# Patient Record
Sex: FEMALE | Race: WHITE | Employment: STUDENT | URBAN - METROPOLITAN AREA
[De-identification: names, ages, dates, MRNs, and addresses within clinical notes are randomized per-mention and may not be internally consistent; named-entity substitution may affect disease eponyms.]

---
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
## 2023-01-28 DIAGNOSIS — F41.1 GENERALIZED ANXIETY DISORDER: ICD-10-CM

## 2023-01-30 RX ORDER — ESCITALOPRAM OXALATE 5 MG/1
5 TABLET ORAL DAILY
Qty: 60 TABLET | Refills: 0 | Status: SHIPPED | OUTPATIENT
Start: 2023-01-30

## 2023-03-08 ENCOUNTER — TELEPHONE (OUTPATIENT)
Dept: FAMILY MEDICINE CLINIC | Facility: CLINIC | Age: 18
End: 2023-03-08

## 2023-03-08 NOTE — TELEPHONE ENCOUNTER
lvm for pt change her appt time on 3/23 due to Dr Elmira Meneses new schedule. Have two later openings on 3/23.

## 2023-03-24 ENCOUNTER — OFFICE VISIT (OUTPATIENT)
Dept: FAMILY MEDICINE CLINIC | Facility: CLINIC | Age: 18
End: 2023-03-24
Payer: COMMERCIAL

## 2023-03-24 VITALS
OXYGEN SATURATION: 98 % | HEIGHT: 65 IN | BODY MASS INDEX: 17.99 KG/M2 | TEMPERATURE: 98 F | RESPIRATION RATE: 16 BRPM | DIASTOLIC BLOOD PRESSURE: 60 MMHG | SYSTOLIC BLOOD PRESSURE: 100 MMHG | HEART RATE: 60 BPM | WEIGHT: 108 LBS

## 2023-03-24 DIAGNOSIS — N94.6 DYSMENORRHEA: ICD-10-CM

## 2023-03-24 DIAGNOSIS — F41.1 GENERALIZED ANXIETY DISORDER: ICD-10-CM

## 2023-03-24 DIAGNOSIS — Z30.41 ENCOUNTER FOR SURVEILLANCE OF CONTRACEPTIVE PILLS: ICD-10-CM

## 2023-03-24 PROCEDURE — 99214 OFFICE O/P EST MOD 30 MIN: CPT | Performed by: FAMILY MEDICINE

## 2023-03-24 PROCEDURE — 3078F DIAST BP <80 MM HG: CPT | Performed by: FAMILY MEDICINE

## 2023-03-24 PROCEDURE — 3008F BODY MASS INDEX DOCD: CPT | Performed by: FAMILY MEDICINE

## 2023-03-24 PROCEDURE — 3074F SYST BP LT 130 MM HG: CPT | Performed by: FAMILY MEDICINE

## 2023-03-24 RX ORDER — NORETHINDRONE ACETATE AND ETHINYL ESTRADIOL 1MG-20(21)
1 KIT ORAL DAILY
Qty: 84 TABLET | Refills: 1 | Status: SHIPPED | OUTPATIENT
Start: 2023-03-24

## 2023-03-24 RX ORDER — ESCITALOPRAM OXALATE 5 MG/1
5 TABLET ORAL DAILY
Qty: 60 TABLET | Refills: 0 | Status: SHIPPED | OUTPATIENT
Start: 2023-03-24

## 2023-08-09 ENCOUNTER — MED REC SCAN ONLY (OUTPATIENT)
Dept: FAMILY MEDICINE CLINIC | Facility: CLINIC | Age: 18
End: 2023-08-09

## 2023-08-09 ENCOUNTER — APPOINTMENT (OUTPATIENT)
Dept: FAMILY MEDICINE CLINIC | Facility: CLINIC | Age: 18
End: 2023-08-09
Payer: COMMERCIAL

## 2023-10-16 ENCOUNTER — OFFICE VISIT (OUTPATIENT)
Dept: FAMILY MEDICINE CLINIC | Facility: CLINIC | Age: 18
End: 2023-10-16
Payer: COMMERCIAL

## 2023-10-16 ENCOUNTER — LAB ENCOUNTER (OUTPATIENT)
Dept: LAB | Age: 18
End: 2023-10-16
Attending: FAMILY MEDICINE
Payer: COMMERCIAL

## 2023-10-16 VITALS
BODY MASS INDEX: 18.33 KG/M2 | TEMPERATURE: 97 F | RESPIRATION RATE: 16 BRPM | WEIGHT: 110 LBS | HEIGHT: 65 IN | DIASTOLIC BLOOD PRESSURE: 78 MMHG | HEART RATE: 67 BPM | SYSTOLIC BLOOD PRESSURE: 110 MMHG | OXYGEN SATURATION: 99 %

## 2023-10-16 DIAGNOSIS — Z11.8 SCREENING FOR CHLAMYDIAL DISEASE: ICD-10-CM

## 2023-10-16 DIAGNOSIS — D75.89 MACROCYTOSIS: ICD-10-CM

## 2023-10-16 DIAGNOSIS — Z00.01 ENCOUNTER FOR GENERAL ADULT MEDICAL EXAMINATION WITH ABNORMAL FINDINGS: Primary | ICD-10-CM

## 2023-10-16 DIAGNOSIS — H61.21 IMPACTED CERUMEN OF RIGHT EAR: ICD-10-CM

## 2023-10-16 DIAGNOSIS — R79.89 ELEVATED SERUM CREATININE: ICD-10-CM

## 2023-10-16 DIAGNOSIS — Z30.41 ENCOUNTER FOR SURVEILLANCE OF CONTRACEPTIVE PILLS: ICD-10-CM

## 2023-10-16 DIAGNOSIS — N94.6 DYSMENORRHEA: ICD-10-CM

## 2023-10-16 DIAGNOSIS — F41.8 SITUATIONAL ANXIETY: ICD-10-CM

## 2023-10-16 PROBLEM — F41.1 GAD (GENERALIZED ANXIETY DISORDER): Status: ACTIVE | Noted: 2023-10-16

## 2023-10-16 PROBLEM — M79.671 RIGHT FOOT PAIN: Status: RESOLVED | Noted: 2021-08-01 | Resolved: 2023-10-16

## 2023-10-16 PROBLEM — M21.611 BUNION, RIGHT: Status: RESOLVED | Noted: 2021-06-01 | Resolved: 2023-10-16

## 2023-10-16 LAB
ANION GAP SERPL CALC-SCNC: 4 MMOL/L (ref 0–18)
BASOPHILS # BLD AUTO: 0.05 X10(3) UL (ref 0–0.2)
BASOPHILS NFR BLD AUTO: 1 %
BUN BLD-MCNC: 8 MG/DL (ref 7–18)
CALCIUM BLD-MCNC: 9.6 MG/DL (ref 8.5–10.1)
CHLORIDE SERPL-SCNC: 107 MMOL/L (ref 98–112)
CO2 SERPL-SCNC: 26 MMOL/L (ref 21–32)
CREAT BLD-MCNC: 0.97 MG/DL
EGFRCR SERPLBLD CKD-EPI 2021: 86 ML/MIN/1.73M2 (ref 60–?)
EOSINOPHIL # BLD AUTO: 0.08 X10(3) UL (ref 0–0.7)
EOSINOPHIL NFR BLD AUTO: 1.6 %
ERYTHROCYTE [DISTWIDTH] IN BLOOD BY AUTOMATED COUNT: 11.9 %
FASTING STATUS PATIENT QL REPORTED: NO
FOLATE SERPL-MCNC: 14 NG/ML (ref 8.7–?)
GLUCOSE BLD-MCNC: 74 MG/DL (ref 70–99)
HCT VFR BLD AUTO: 38 %
HGB BLD-MCNC: 12.5 G/DL
IMM GRANULOCYTES # BLD AUTO: 0.01 X10(3) UL (ref 0–1)
IMM GRANULOCYTES NFR BLD: 0.2 %
LYMPHOCYTES # BLD AUTO: 1.7 X10(3) UL (ref 1–4)
LYMPHOCYTES NFR BLD AUTO: 33.5 %
MCH RBC QN AUTO: 32.6 PG (ref 26–34)
MCHC RBC AUTO-ENTMCNC: 32.9 G/DL (ref 31–37)
MCV RBC AUTO: 99.2 FL
MONOCYTES # BLD AUTO: 0.31 X10(3) UL (ref 0.1–1)
MONOCYTES NFR BLD AUTO: 6.1 %
NEUTROPHILS # BLD AUTO: 2.92 X10 (3) UL (ref 1.5–7.7)
NEUTROPHILS # BLD AUTO: 2.92 X10(3) UL (ref 1.5–7.7)
NEUTROPHILS NFR BLD AUTO: 57.6 %
OSMOLALITY SERPL CALC.SUM OF ELEC: 281 MOSM/KG (ref 275–295)
PLATELET # BLD AUTO: 306 10(3)UL (ref 150–450)
POTASSIUM SERPL-SCNC: 4.6 MMOL/L (ref 3.5–5.1)
RBC # BLD AUTO: 3.83 X10(6)UL
SODIUM SERPL-SCNC: 137 MMOL/L (ref 136–145)
VIT B12 SERPL-MCNC: 557 PG/ML (ref 193–986)
WBC # BLD AUTO: 5.1 X10(3) UL (ref 4–11)

## 2023-10-16 PROCEDURE — 82607 VITAMIN B-12: CPT

## 2023-10-16 PROCEDURE — 87491 CHLMYD TRACH DNA AMP PROBE: CPT | Performed by: FAMILY MEDICINE

## 2023-10-16 PROCEDURE — 82746 ASSAY OF FOLIC ACID SERUM: CPT

## 2023-10-16 PROCEDURE — 87591 N.GONORRHOEAE DNA AMP PROB: CPT | Performed by: FAMILY MEDICINE

## 2023-10-16 PROCEDURE — 36415 COLL VENOUS BLD VENIPUNCTURE: CPT

## 2023-10-16 PROCEDURE — 99213 OFFICE O/P EST LOW 20 MIN: CPT | Performed by: FAMILY MEDICINE

## 2023-10-16 PROCEDURE — 80048 BASIC METABOLIC PNL TOTAL CA: CPT

## 2023-10-16 PROCEDURE — G0438 PPPS, INITIAL VISIT: HCPCS | Performed by: FAMILY MEDICINE

## 2023-10-16 PROCEDURE — 99395 PREV VISIT EST AGE 18-39: CPT | Performed by: FAMILY MEDICINE

## 2023-10-16 PROCEDURE — 3008F BODY MASS INDEX DOCD: CPT | Performed by: FAMILY MEDICINE

## 2023-10-16 PROCEDURE — 85025 COMPLETE CBC W/AUTO DIFF WBC: CPT

## 2023-10-16 PROCEDURE — 3074F SYST BP LT 130 MM HG: CPT | Performed by: FAMILY MEDICINE

## 2023-10-16 PROCEDURE — 3078F DIAST BP <80 MM HG: CPT | Performed by: FAMILY MEDICINE

## 2023-10-16 RX ORDER — BUPROPION HYDROCHLORIDE 150 MG/1
150 TABLET ORAL DAILY
Qty: 30 TABLET | Refills: 2 | Status: SHIPPED | OUTPATIENT
Start: 2023-10-16

## 2023-10-16 RX ORDER — NORETHINDRONE ACETATE AND ETHINYL ESTRADIOL 1MG-20(21)
1 KIT ORAL DAILY
Qty: 84 TABLET | Refills: 1 | Status: SHIPPED | OUTPATIENT
Start: 2023-10-16

## 2023-10-17 LAB
C TRACH DNA SPEC QL NAA+PROBE: NEGATIVE
N GONORRHOEA DNA SPEC QL NAA+PROBE: NEGATIVE

## 2023-10-24 ENCOUNTER — PATIENT MESSAGE (OUTPATIENT)
Dept: FAMILY MEDICINE CLINIC | Facility: CLINIC | Age: 18
End: 2023-10-24

## 2024-01-08 DIAGNOSIS — F41.8 SITUATIONAL ANXIETY: ICD-10-CM

## 2024-01-09 RX ORDER — BUPROPION HYDROCHLORIDE 150 MG/1
150 TABLET ORAL DAILY
Qty: 30 TABLET | Refills: 0 | Status: SHIPPED | OUTPATIENT
Start: 2024-01-09

## 2024-01-09 NOTE — TELEPHONE ENCOUNTER
LOV 12/18/2023      LAST LAB 10/16/2023    LAST RX   Medication Quantity Refills Start End   buPROPion  MG Oral Tablet 24 Hr 30 tablet 2 10/16/2023 --   Sig:   Take 1 tablet (150 mg total) by mouth daily.         Next OV No future appointments.     PROTOCOL NONE

## 2024-01-28 DIAGNOSIS — F41.1 GAD (GENERALIZED ANXIETY DISORDER): ICD-10-CM

## 2024-01-29 RX ORDER — ESCITALOPRAM OXALATE 5 MG/1
5 TABLET ORAL DAILY
Qty: 90 TABLET | Refills: 0 | Status: SHIPPED | OUTPATIENT
Start: 2024-01-29

## 2024-01-29 NOTE — TELEPHONE ENCOUNTER
LOV 12/18/2023    LAST LAB    LAST RX   Medication Quantity Refills Start End   escitalopram 5 MG Oral Tab 46 tablet 0 12/18/2023 2/4/2024   Sig:   Take 0.5 tablets (2.5 mg total) by mouth daily for 7 days, THEN 1 tablet (5 mg total) daily.         Next OV No future appointments.     PROTOCOL NONE

## 2024-02-09 DIAGNOSIS — F41.8 SITUATIONAL ANXIETY: ICD-10-CM

## 2024-02-09 RX ORDER — BUPROPION HYDROCHLORIDE 150 MG/1
150 TABLET ORAL DAILY
Qty: 90 TABLET | Refills: 0 | Status: SHIPPED | OUTPATIENT
Start: 2024-02-09

## 2024-02-09 NOTE — TELEPHONE ENCOUNTER
Psychiatric Non-Scheduled (Anti-Anxiety) Fxxiso1602/09/2024 06:42 AM   Protocol Details In person appointment or virtual visit in the past 6 mos or appointment in next 3 mos    Depression Screening completed within the past 12 months        LOV 12/18/23 video dr zainab [facility]  n/a     LAST RX  1/9/24 30     Next OV No future appointments.      PROTOCOL pass

## 2024-02-12 RX ORDER — BUPROPION HYDROCHLORIDE 150 MG/1
150 TABLET ORAL DAILY
Qty: 30 TABLET | Refills: 0 | OUTPATIENT
Start: 2024-02-12

## 2024-02-12 NOTE — TELEPHONE ENCOUNTER
LOV 12/8/23    LAST LAB     LAST RX    Medication Quantity Refills Start End   buPROPion  MG Oral Tablet 24 Hr 90 tablet 0 2/9/2024 --   Sig:   Take 1 tablet (150 mg total) by mouth daily.         Next OV No future appointments.     PROTOCOL     Psychiatric Non-Scheduled (Anti-Anxiety) Ykodks2202/09/2024 01:32 PM   Protocol Details In person appointment or virtual visit in the past 6 mos or appointment in next 3 mos    Depression Screening completed within the past 12 months

## 2024-05-03 DIAGNOSIS — F41.1 GAD (GENERALIZED ANXIETY DISORDER): ICD-10-CM

## 2024-05-04 NOTE — TELEPHONE ENCOUNTER
Return for 4-6wks for mental health . Can be VV and if need to push due to her being away for school, this is okay.    Lov 12/18/2023. Please advise?   Mcm sent

## 2024-05-04 NOTE — TELEPHONE ENCOUNTER
LOV 12/18/2023    LAST LAB    LAST RX   Medication Quantity Refills Start End   escitalopram 5 MG Oral Tab 90 tablet 0 1/29/2024 --   Sig:   Take 1 tablet (5 mg total) by mouth daily.     Route:   Oral         Next OV No future appointments.     PROTOCOL     Psychiatric Non-Scheduled (Anti-Anxiety) Omeozi4905/03/2024 01:46 PM   Protocol Details In person appointment or virtual visit in the past 6 mos or appointment in next 3 mos    Depression Screening completed within the past 12 months

## 2024-05-05 RX ORDER — ESCITALOPRAM OXALATE 5 MG/1
5 TABLET ORAL DAILY
Qty: 90 TABLET | Refills: 0 | Status: SHIPPED | OUTPATIENT
Start: 2024-05-05

## 2024-08-01 NOTE — PATIENT INSTRUCTIONS
When Your Child Has Sinusitis    Sinuses are hollow spaces in the bones of the face. Healthy sinuses constantly make and drain mucus. This helps keep the nasal passages clean. But an underlying problem can keep sinuses from draining properly.  This can le · Thick discolored drainage from the nose  · Nasal congestion  · Pain and pressure around the eyes, nose, cheeks, or forehead  · Headache  · Cough  · Thick mucus draining down the back of the throat (postnasal drainage)  · Fever  · Loss of smell  How is si · Antibiotics. Your child our child may need to take antibiotic medicine for a longer time. If bacteria aren't the cause, antibiotics won't help. · Inhaled corticosteroid medicines. Nasal sprays or drops with steroids are often prescribed.   · Other medici · Teach your child to wash his or her hands correctly and often. It’s the best way to prevent most infections. · Make sure your child eats nutritious meals and drinks plenty of fluids.   · Keep your child away from people who are sick, especially during co [place]

## 2024-08-06 DIAGNOSIS — F41.1 GAD (GENERALIZED ANXIETY DISORDER): ICD-10-CM

## 2024-08-06 DIAGNOSIS — F41.8 SITUATIONAL ANXIETY: ICD-10-CM

## 2024-08-09 NOTE — TELEPHONE ENCOUNTER
Refill passed per [facility] protocol.    Please review last telemedicine  visit,  05/07/2024     last refill 05/07/2024    Is refill appropriate?     From last Telemedicine visit discussed to increase lexapro 10 mg  and cut back on Wellbutrin please advise     ASSESSMENT & PLAN  Sara Raya is a 19 year old female who is here for:      1. SARA (generalized anxiety disorder)-has struggled off and on with anxiety surrounding school, does welll outside of school. Igor point where she would like to cut back on meds;   - Discussed cutting back on Wellbutrin, RX given so she has it while abroad.   [- Increase Lexapro 10mg   - Propranolol on hand for prn panic use.   -CBT/Counseling: none   -Aware of importance of adequate sleep, nutrition, and physical activity.        Requested Prescriptions   Pending Prescriptions Disp Refills    BUPROPION  MG Oral Tablet 24 Hr [Pharmacy Med Name: buPROPion HCl ER (XL) Oral Tablet Extended Release 24 Hour 150 MG] 90 tablet 0     Sig: TAKE 1 TABLET BY MOUTH EVERY DAY       Psychiatric Non-Scheduled (Anti-Anxiety) Passed - 8/6/2024  9:56 AM        Passed - In person appointment or virtual visit in the past 6 mos or appointment in next 3 mos     Recent Outpatient Visits              3 months ago SARA (generalized anxiety disorder)    [facility] [place] [address] [place], [place] [address]Ralph Christina, DO    Telemedicine    7 months ago SARA (generalized anxiety disorder)    [facility] ([place] [place]), [address] [place] [address] Alyssa Mckeon DO    Telemedicine    9 months ago Encounter for general adult medical examination with abnormal findings    [facility] [place]) [address] [place], [place] [address]Ralph Christina, DO    Office Visit    1 year ago Situational anxiety    [facility] ([place] [place]), [address] [place], [place] [address]Ralph Christina, DO    Office Visit    1 year ago Dysmenorrhea    [facility] ([place] [place]), [address] [place], [place] [address]Ralph Christina, DO    Office  Visit          Future Appointments         Provider Department Appt Notes    In 4 days Alyssa Davenport DO [address] Back to school physical - TB test, Pap smear                    Passed - Depression Screening completed within the past 12 months          ESCITALOPRAM 10 MG Oral Tab [Pharmacy Med Name: Escitalopram Oxalate Oral Tablet 10 MG] 90 tablet 0     Sig: Take 1 tablet (10 mg total) by mouth daily.       Psychiatric Non-Scheduled (Anti-Anxiety) Passed - 8/6/2024  9:56 AM        Passed - In person appointment or virtual visit in the past 6 mos or appointment in next 3 mos     Recent Outpatient Visits              3 months ago SARA (generalized anxiety disorder)    [facility], [address] [place] [address] Alyssa Davenport DO    Telemedicine    7 months ago SARA (generalized anxiety disorder)    [facility], [address] [place] [address] Alyssa Davenport,     Telemedicine    9 months ago Encounter for general adult medical examination with abnormal findings    [facility], [address] [place] [address] Alyssa Davenport,     Office Visit    1 year ago Situational anxiety    [address],     Office Visit    1 year ago Dysmenorrhea    [address] Alyssa Davenport,     Office Visit          Future Appointments         Provider Department Appt Notes    In 4 days Alyssa Davenport DO [address] Back to school physical - TB test, Pap smear                    Passed - Depression Screening completed within the past 12 months           Recent Outpatient Visits              3 months ago SARA (generalized anxiety disorder)    [address]    Telemedicine    7 months ago SARA (generalized anxiety disorder)    [facility],  [address] [place] [address] Alyssa Davenport DO    Telemedicine    9 months ago Encounter for general adult medical examination with abnormal findings    [facility] [place] [facility], [address] [place] [address] Alyssa Davenport DO    Office Visit    1 year ago Situational anxiety    [facility], [address] [place] [address] [place] Alyssa Davenport DO    Office Visit    1 year ago Dysmenorrhea    [facility], [address] [place] [address] Alyssa Davenport DO    Office Visit          Future Appointments         Provider Department Appt Notes    In 4 days Alyssa Davenport DO [facility], [address] [place] [address] Back to school physical - TB test, Pap smear

## 2024-08-12 RX ORDER — ESCITALOPRAM OXALATE 10 MG/1
10 TABLET ORAL DAILY
Qty: 90 TABLET | Refills: 0 | Status: SHIPPED | OUTPATIENT
Start: 2024-08-12

## 2024-08-12 RX ORDER — BUPROPION HYDROCHLORIDE 150 MG/1
150 TABLET ORAL DAILY
Qty: 90 TABLET | Refills: 0 | Status: SHIPPED | OUTPATIENT
Start: 2024-08-12

## 2024-08-13 ENCOUNTER — OFFICE VISIT (OUTPATIENT)
Dept: FAMILY MEDICINE CLINIC | Facility: CLINIC | Age: 19
End: 2024-08-13
Payer: COMMERCIAL

## 2024-08-13 VITALS
TEMPERATURE: 97 F | OXYGEN SATURATION: 98 % | RESPIRATION RATE: 18 BRPM | WEIGHT: 127 LBS | BODY MASS INDEX: 21.16 KG/M2 | SYSTOLIC BLOOD PRESSURE: 100 MMHG | HEIGHT: 65 IN | HEART RATE: 78 BPM | DIASTOLIC BLOOD PRESSURE: 70 MMHG

## 2024-08-13 DIAGNOSIS — Z11.1 SCREENING-PULMONARY TB: ICD-10-CM

## 2024-08-13 DIAGNOSIS — F41.8 SITUATIONAL ANXIETY: ICD-10-CM

## 2024-08-13 DIAGNOSIS — Z23 IMMUNIZATION DUE: ICD-10-CM

## 2024-08-13 DIAGNOSIS — Z02.1 PHYSICAL EXAM, PRE-EMPLOYMENT: Primary | ICD-10-CM

## 2024-08-13 PROCEDURE — 99213 OFFICE O/P EST LOW 20 MIN: CPT | Performed by: FAMILY MEDICINE

## 2024-08-13 PROCEDURE — 90471 IMMUNIZATION ADMIN: CPT | Performed by: FAMILY MEDICINE

## 2024-08-13 PROCEDURE — 90715 TDAP VACCINE 7 YRS/> IM: CPT | Performed by: FAMILY MEDICINE

## 2024-08-13 PROCEDURE — 3074F SYST BP LT 130 MM HG: CPT | Performed by: FAMILY MEDICINE

## 2024-08-13 PROCEDURE — 3008F BODY MASS INDEX DOCD: CPT | Performed by: FAMILY MEDICINE

## 2024-08-13 PROCEDURE — 3078F DIAST BP <80 MM HG: CPT | Performed by: FAMILY MEDICINE

## 2024-08-13 PROCEDURE — 86580 TB INTRADERMAL TEST: CPT | Performed by: FAMILY MEDICINE

## 2024-08-13 RX ORDER — BUPROPION HYDROCHLORIDE 150 MG/1
150 TABLET ORAL DAILY
Qty: 90 TABLET | Refills: 0 | Status: SHIPPED | OUTPATIENT
Start: 2024-08-13

## 2024-08-13 NOTE — PROGRESS NOTES
Family Medicine Progress Note  Assessment & Plan:   Sara Raya is a 19 year old female who is here for:     1. 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  TB test ordered.    Good health.  TDAP given   Plans to schedule pap     2. Immunization due  - TdaP (Adacel, Boostrix) [91124]    3. Screening-pulmonary TB  - TB test, PPD/Tubersol/Aplisol [69612] (Dx Z11.1)    4. Situational anxiety-  needs refill, feels like she is overall doing well.   - buPROPion  MG Oral Tablet 24 Hr; Take 1 tablet (150 mg total) by mouth daily.  Dispense: 90 tablet; Refill: 0     Follow-Up: as needed/pap smear      Alyssa Davenport DO        CC: School Physical (Here for school physical, TB test, Tdap )    Subjective:    History of Present Illness:  History obtained from patient.     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  Enjoyed it!   Going into another month of L&D; is still her favorite!       FU ANXIETY   Meds:  has used Wellbutrin to help with focus and some anxiety ---would like refill.    Mood: good, doing well with clinicals, would   Suicidal/Homicidal Thoughts: denies   Counseling: None   Sleep:  no concerns   Prior med- Lexapro     Dysmenorrhea- OCPs for mgmt.  Situational Anxiety-Wellbutrin;   Pmhx: SARA  Pshx: Bunionectomy   All: NKDA;   Meds: as below  Fam hx: family history is not on file.   Soc hx: no tob/occ etoh/no illicits; [employer] [place], RN School. Sexually active in the past;   Ob/gyne: Patient's last menstrual period was 2024.. OCPs ,  ,       History/Other:   ROS-Per HPI     Problem List:  Patient Active Problem List   Diagnosis    SARA (generalized anxiety disorder)       Current Medications:  Current Outpatient Medications   Medication Sig Dispense Refill     buPROPion  MG Oral Tablet 24 Hr Take 1 tablet (150 mg total) by mouth daily. 90 tablet 0    escitalopram 10 MG Oral Tab Take 1 tablet (10 mg total) by mouth daily. 90 tablet 0    Norethin Ace-Eth Estrad-FE (BLISOVI FE 1/20) 1-20 MG-MCG Oral Tab Take 1 tablet by mouth daily. 84 tablet 1      Past Medical History:  Past Medical History:    Anxiety    Bunion, right      Past Surgical History:  Past Surgical History:   Procedure Laterality Date    Bunionectomy  7/8/2021    bunionectomy      Family History:  History reviewed. No pertinent family history.   Social History:  Social History     Socioeconomic History    Marital status: Single   Tobacco Use    Smoking status: Never    Smokeless tobacco: Never   Vaping Use    Vaping status: Never Used   Substance and Sexual Activity    Alcohol use: No    Drug use: No    Sexual activity: Not Currently   Other Topics Concern    Caffeine Concern No    Exercise Yes    Seat Belt Yes    Special Diet No    Stress Concern No    Weight Concern No     Social Determinants of Health     Food Insecurity: No Food Insecurity (3/19/2023)    Received from [facility], [facility], [facility], [facility]    Hunger Vital Sign     Worried About Running Out of Food in the Last Year: Never true     Ran Out of Food in the Last Year: Never true       Allergies:  No Known Allergies     Objective:    VITALS: /70   Pulse 78   Temp 97.4 °F (36.3 °C) (Temporal)   Resp 18   Ht 5' 5\" (1.651 m)   Wt 127 lb (57.6 kg)   LMP 07/28/2024   SpO2 98%   BMI 21.13 kg/m²      BP Readings from Last 3 Encounters:   08/13/24 100/70   10/16/23 110/78   08/07/23 98/64     Wt Readings from Last 3 Encounters:   08/13/24 127 lb (57.6 kg)   10/16/23 110 lb (49.9 kg)   08/07/23 116 lb (52.6 kg)         PHYSICAL EXAM  GEN: pleasant, well-appearing in NAD, AOX3  SKIN: no visible rashes, lesions, or evidence of trauma  HEENT: PERRL, EOMI, moist mucous membranes, oropharynx  clear, RT Canal with cerumen impaction; LT TM clear , nares patent, no Thyromegaly or nodule.   CV: RRR, no murmurs or abnl heart sounds   PULM: Clear to auscultation, No wheezes, rales, rhonchi.  Non-labored breathing.  ABD: Soft, non-tender, non-distended, + BS, no rigidity/guarding  EXT:  Warm, well perfused, no lower extremity edema  NEURO: CNs grossly intact, no focal weakness  MSK: moves all 4 extremities without difficulty  PSYCH: mood and affect are appropriate     MENTAL STATUS EXAM  Appearance: groomed appropriately, makes good eye contact  Attitude: cooperative  Motor: No psychomotor agitation/depression   Speech: normal rate and rhythm   Mood: euthymic   Affect:  mood congruent  Form of Thought:  Fluid  Thought Content: No SI/HI   Perception: No hallucinations or delusions  Judgement and Insight- Intact         Alyssa Davenport DO

## 2024-08-15 ENCOUNTER — NURSE ONLY (OUTPATIENT)
Dept: FAMILY MEDICINE CLINIC | Facility: CLINIC | Age: 19
End: 2024-08-15
Payer: COMMERCIAL

## 2024-08-15 LAB — INDURATION (): 0 MM (ref 0–11)

## 2024-10-30 DIAGNOSIS — N94.6 DYSMENORRHEA: ICD-10-CM

## 2024-10-30 DIAGNOSIS — Z30.41 ENCOUNTER FOR SURVEILLANCE OF CONTRACEPTIVE PILLS: ICD-10-CM

## 2024-10-30 DIAGNOSIS — F41.1 GAD (GENERALIZED ANXIETY DISORDER): ICD-10-CM

## 2024-11-01 RX ORDER — NORETHINDRONE ACETATE AND ETHINYL ESTRADIOL 1MG-20(21)
1 KIT ORAL DAILY
Qty: 28 TABLET | Refills: 0 | Status: SHIPPED | OUTPATIENT
Start: 2024-11-01

## 2024-11-01 RX ORDER — NORETHINDRONE ACETATE AND ETHINYL ESTRADIOL 1MG-20(21)
1 KIT ORAL DAILY
Qty: 84 TABLET | Refills: 0 | OUTPATIENT
Start: 2024-11-01

## 2024-11-01 RX ORDER — ESCITALOPRAM OXALATE 10 MG/1
10 TABLET ORAL DAILY
Qty: 90 TABLET | Refills: 3 | Status: SHIPPED | OUTPATIENT
Start: 2024-11-01

## 2024-11-01 NOTE — TELEPHONE ENCOUNTER
Refill passed per [facility] protocols.    Requested Prescriptions   Pending Prescriptions Disp Refills    escitalopram 10 MG Oral Tab [Pharmacy Med Name: Escitalopram Oxalate Oral Tablet 10 MG] 90 tablet 3     Sig: Take 1 tablet (10 mg total) by mouth daily.       Psychiatric Non-Scheduled (Anti-Anxiety) Passed - 11/1/2024 11:08 AM        Passed - In person appointment or virtual visit in the past 6 mos or appointment in next 3 mos     Recent Outpatient Visits              2 months ago     [facility], [address] [place] [address] [place]    Nurse Only    2 months ago Physical exam, pre-employment    [facility] [address] [place] [address] [place],     Office Visit    5 months ago SARA (generalized anxiety disorder)    [facility] [address] [place] [address] [place]    Telemedicine    10 months ago SARA (generalized anxiety disorder)    [facility], [address] [place] [address] [place]    Telemedicine    1 year ago Encounter for general adult medical examination with abnormal findings    [facility] [address] [place] [address] [place],     Office Visit                      Passed - Depression Screening completed within the past 12 months         Refused Prescriptions Disp Refills    Norethin Ace-Eth Estrad-FE (BLISOVI FE 1/20) 1-20 MG-MCG Oral Tab 84 tablet 0     Sig: Take 1 tablet by mouth daily.       Gynecology Medication Protocol Failed - 11/1/2024 11:08 AM        Failed - PASS-PENDING LAST PAP WNL--VIA MANUAL LOOKUP        Passed - Physical or Pelvic/Breast in past 12 or next 3 mos--VIA MANUAL LOOKUP

## 2024-11-04 DIAGNOSIS — Z30.41 ENCOUNTER FOR SURVEILLANCE OF CONTRACEPTIVE PILLS: ICD-10-CM

## 2024-11-04 DIAGNOSIS — N94.6 DYSMENORRHEA: ICD-10-CM

## 2024-11-04 RX ORDER — NORETHINDRONE ACETATE AND ETHINYL ESTRADIOL 1MG-20(21)
1 KIT ORAL DAILY
Qty: 84 TABLET | Refills: 0 | OUTPATIENT
Start: 2024-11-04

## 2024-11-24 DIAGNOSIS — Z30.41 ENCOUNTER FOR SURVEILLANCE OF CONTRACEPTIVE PILLS: ICD-10-CM

## 2024-11-24 DIAGNOSIS — N94.6 DYSMENORRHEA: ICD-10-CM

## 2024-11-25 RX ORDER — NORETHINDRONE ACETATE AND ETHINYL ESTRADIOL 1MG-20(21)
1 KIT ORAL DAILY
Qty: 84 TABLET | Refills: 0 | Status: SHIPPED | OUTPATIENT
Start: 2024-11-25

## 2024-11-25 NOTE — TELEPHONE ENCOUNTER
Gynecology Medication Protocol Failed 11/24/2024 07:43 AM   Protocol Details PASS-PENDING LAST PAP WNL--VIA MANUAL LOOKUP    Physical or Pelvic/Breast in past 12 or next 3 mos--VIA MANUAL LOOKUP        LOV 8/13/24     LAST LAB n/a     LAST RX 11/1/24 28 tabs     Next OV   Future Appointments   Date Time Provider Department Center   12/19/2024 11:20 AM Alyssa Davenport DO [facility]         PROTOCOL failed

## 2024-12-19 ENCOUNTER — OFFICE VISIT (OUTPATIENT)
Dept: FAMILY MEDICINE CLINIC | Facility: CLINIC | Age: 19
End: 2024-12-19
Payer: COMMERCIAL

## 2024-12-19 VITALS
RESPIRATION RATE: 16 BRPM | HEART RATE: 80 BPM | SYSTOLIC BLOOD PRESSURE: 90 MMHG | TEMPERATURE: 98 F | BODY MASS INDEX: 20.16 KG/M2 | WEIGHT: 121 LBS | OXYGEN SATURATION: 98 % | HEIGHT: 65 IN | DIASTOLIC BLOOD PRESSURE: 58 MMHG

## 2024-12-19 DIAGNOSIS — Z12.4 ENCOUNTER FOR SCREENING FOR CERVICAL CANCER: ICD-10-CM

## 2024-12-19 DIAGNOSIS — D23.9: ICD-10-CM

## 2024-12-19 DIAGNOSIS — R41.840 ATTENTION AND CONCENTRATION DEFICIT: ICD-10-CM

## 2024-12-19 DIAGNOSIS — R22.41 MASS OF RIGHT LOWER EXTREMITY: ICD-10-CM

## 2024-12-19 DIAGNOSIS — N94.6 DYSMENORRHEA: ICD-10-CM

## 2024-12-19 DIAGNOSIS — Z30.41 ENCOUNTER FOR SURVEILLANCE OF CONTRACEPTIVE PILLS: ICD-10-CM

## 2024-12-19 DIAGNOSIS — F41.1 GAD (GENERALIZED ANXIETY DISORDER): ICD-10-CM

## 2024-12-19 DIAGNOSIS — R53.83 FATIGUE, UNSPECIFIED TYPE: ICD-10-CM

## 2024-12-19 DIAGNOSIS — Z00.01 ENCOUNTER FOR GENERAL ADULT MEDICAL EXAMINATION WITH ABNORMAL FINDINGS: Primary | ICD-10-CM

## 2024-12-19 PROCEDURE — 88175 CYTOPATH C/V AUTO FLUID REDO: CPT | Performed by: FAMILY MEDICINE

## 2024-12-19 RX ORDER — NORETHINDRONE ACETATE AND ETHINYL ESTRADIOL 1MG-20(21)
1 KIT ORAL DAILY
Qty: 84 TABLET | Refills: 3 | Status: SHIPPED | OUTPATIENT
Start: 2024-12-19

## 2024-12-19 RX ORDER — DEXTROAMPHETAMINE SACCHARATE, AMPHETAMINE ASPARTATE MONOHYDRATE, DEXTROAMPHETAMINE SULFATE AND AMPHETAMINE SULFATE 2.5; 2.5; 2.5; 2.5 MG/1; MG/1; MG/1; MG/1
10 CAPSULE, EXTENDED RELEASE ORAL EVERY MORNING
Qty: 30 CAPSULE | Refills: 0 | Status: SHIPPED | OUTPATIENT
Start: 2024-12-19 | End: 2025-01-18

## 2024-12-19 NOTE — PROGRESS NOTES
Addended by: Viviane Nelson on: 2/27/2023 08:35 AM     Modules accepted: Level of Service Family Medicine Progress Note  Assessment & Plan:   Sara Raya is a 19 year old female who is here for:   1. Encounter for general adult medical examination with abnormal findings  Wellness Exam done today and routine Preventative Care discussed as noted below.   -Pap smear: -  Today   -G&C testing: completed at school   -Contraception:  ocp   -Immunizations:  up-to-date   -Routine labs ordered.   -Healthy eating habits and regular exercise encouraged   - CBC With Differential With Platelet; Future  - Comp Metabolic Panel (14); Future  - TSH W Reflex To Free T4; Future  - Lipid Panel; Future    2. Encounter for screening for cervical cancer  - ThinPrep Pap with HPV Reflex, Chlamydia/GC; Future  - ThinPrep Pap with HPV Reflex, Chlamydia/GC    3. Fatigue, unspecified type-  doesn't feel its her medication, will plan to ensure no nutritional contribution.  Adequate sleep/diet/exercise.    - Vitamin D, 25-Hydroxy; Future  - Vitamin B12; Future  - Ferritin [E]; Future    4. SARA (generalized anxiety disorder)-  Patient describes mood as Euthymic.  Meds controlling symptoms based on GAD7.   -Contin Lexapro 10mg   -CBT/Counseling: none  -Aware of importance of adequate sleep, nutrition, and physical activity.   -Aware of ER Precautions for SI/HI.  Ant to Return to clinic if develop worsening symptoms or adverse effects from RX therapy.      5. Attention and concentration deficit- Pt has symptoms consistent with adult ADD/ADHD. Would like to trial stimulant medication. EKG not needed (no symptoms of dizziness, syncope, palpitations, exercise intolerance); no h/o arrhythmia, structural cardiac abnormalities; no FMH Sudden cardiac death, arrhythmia, early heart attack)- has tried self mgmmt and continues to be an issue even with Wellbutrin.  PHQ9 up, but much of this is due to fatigue/focus   - Adderral Xr10mg   - Discussed work arounds and behavior modifications. Adult ADHD self care book discussed.   - Discussed addictive  nature of stimulants, need to take just as directed, never to give to anyone else, no early refills, lost Rx can not be rewritten, Rx can not be mailed  - Warned of side effects of anxiety, palpitations, decreased appetite, insomnia  - Once med is titrated, will need twice yearly visits.   - FU 1 mos to check BP, HR, AE.       - amphetamine-dextroamphetamine ER (ADDERALL XR) 10 MG Oral Capsule SR 24 Hr; Take 1 capsule (10 mg total) by mouth every morning.  Dispense: 30 capsule; Refill: 0    6. Benign epithelial neoplasm of skin- reassurance based on flat borwn macules to back, would benefit from annual FBSE     7. Mass of right lower extremity-  ~ 2.5cm soft mass to the anterior LE, just lateral to the shin, persistent and more prominent with exercise/.  US ordered  - US LEG RIGHT LIMITED (CPT=76882); Future    8. Dysmenorrhea- Chronic, Stable, contin meds as below   - Norethin Ace-Eth Estrad-FE (BLISOVI FE 1/20) 1-20 MG-MCG Oral Tab; Take 1 tablet by mouth daily.  Dispense: 84 tablet; Refill: 3    9. Encounter for surveillance of contraceptive pills  - Norethin Ace-Eth Estrad-FE (BLISOVI FE 1/20) 1-20 MG-MCG Oral Tab; Take 1 tablet by mouth daily.  Dispense: 84 tablet; Refill: 3       Follow-Up: 3 wks follow-up Marissa Davenport DO        CC: Physical (With pap)    Subjective:    History of Present Illness:  History obtained from patient.     Sara Raya is a 19 year old female who presents for Physical (With pap)       ANNUAL PHYSICAL  Menses: Regular without any break through bleeding or concerning symptoms.   LMP: Patient's last menstrual period was 11/26/2024.  Concern for STI: Denies   Contraception:  OCPs   Cervical Cancer Screening- due    PMH of Abnormal Pap: denies - no prior   Exercise: generally tries to be active- routine exercise   Healthy eating habits:  Well-rounded  Tobacco: denies   Immunizations: up-to-date      Rt Anterior Lateral shin about 2cm, soft  lump, within the muscle, more  prominent with exercise/activity.      FATIGUE/FOCUS-  finding that she has been cosnistently having difficulty with focus.  Has progressively been an issue over the past couple years, but she feels like this past semester it was really bad;  routinely distracted in class/on tests-- has to reread questions mulltiple times, or when she's on a rotation and multiple people are talking she has a really hard time following the conversation,w ill get overwhelmed and then zone out---- this impacts her at school and in outside of school relationships.    Looses items;  distracted while driving--- no accidents, has been trying work-arounds, doesn't necessarily feel like Wellbutirn was helpful   Always tired, regardless of sleep; Getting 9 hours of sleep.   Routine exercise, Diet is healthy,   Can't focus and easy distraction. NCLEX upcoming.    Doesn't necessarily feel like the Lexapro is causing issues.      FU ANXIETY   Meds:Lexapro 10mg   Mood: good, doing well with clinicals, would   Suicidal/Homicidal Thoughts: denies   Counseling: None   Sleep:  no concerns     Dysmenorrhea- OCPs for mgmt.  Situational Anxiety-Lexparo    Pmhx: SARA  Pshx: Bunionectomy   All: NKDA;   Meds: as below  Fam hx: family history is not on file.   Soc hx: no tob/occ etoh/no illicits; [employer], [employer]. Sexually active in the past;   Ob/gyne: Patient's last menstrual period was 2024.. OCPs ,  ,       SARA-7 Scale       Feeling nervous, anxious, or on edge: Not at all  Not being able to stop or control worrying: Not at all  Worrying too much about different things   : Not at all  Trouble relaxing: Not at all  Being so restless that it's hard to sit still: Several days  Becoming easily annoyed or irritable: Several days  Feeling afraid as if something awful might happen: Not at all    SARA 7 Total Score: 2  If you checked off any problems, how difficult have these made it for you to do your work, take care of things at home, or get  along with other people?: Somewhat difficult         PHQ9 Scale     1. Little interest or pleasure in doing things: Not at all  2. Feeling down, depressed, or hopeless: Several days  3. Trouble falling or staying asleep, or sleeping too much: More than half the days  4. Feeling tired or having little energy: Nearly every day  5. Poor appetite or overeating: Not at all  6. Feeling bad about yourself - or that you are a failure or have let yourself or your family down: Several days  7. Trouble concentrating on things, such as reading the newspaper or watching television: Nearly every day  8. Moving or speaking so slowly that other people could have noticed. Or the opposite - being so fidgety or restless that you have been moving around a lot more than usual: Several days  9. Thoughts that you would be better off dead, or of hurting yourself in some way: Not at all  PHQ-9 TOTAL SCORE: 11  If you checked off any problems, how difficult have these problems made it for you to do your work, take care of things at home, or get along with other people?: Somewhat difficult        Patient completed the Adult ADHD Self-Report Scale (ASRS-v1.1)   How often do you have trouble wrapping up the final details of a project, once the challenging parts have been done? 4   How often do you have difficulty getting things in order when you have to do a task that requires organization? 3   How often do you have problems remembering appointments or obligations? 2   When you have a task that requires a lot of thought, how often do you avoid or delay getting started? 5   How often do you fidget or squirm with your hands or feet when you have to sit down for a long time? 4   How often do you feel overly active and compelled to do things, like you were driven by a motor? 1   How often do you make careless mistakes when you have to work on a boring or difficult project? 3   How often do you have difficulty keeping your attention when you are  doing boring or repetitive work? 5   How often do you have difficulty concentrating on what people say to you, even when they are speaking to you directly? 4   How often do you misplace or have difficulty finding things at home or at work? 3   How often are you distracted by activity or noise around you? 4   How often do you leave your seat in meetings or other situations in which you are expected to remain seated? 3   How often do you feel restless or fidgety? 3   How often do you have difficulty unwinding and relaxing when you have time to yourself? 2   How often do you find yourself talking too much when you are in social situations? 3   When you’re in a conversation, how often do you find yourself finishing the sentences of the people you are talking to, before they can finish them themselves? 3   How often do you have difficulty waiting your turn in situations when turn taking is required? 4   How often do you interrupt others when they are busy? 2      History/Other:   ROS-Per HPI     Problem List:  Patient Active Problem List   Diagnosis    SARA (generalized anxiety disorder)       Current Medications:  Current Outpatient Medications   Medication Sig Dispense Refill    Norethin Ace-Eth Estrad-FE (BLISOVI FE 1/20) 1-20 MG-MCG Oral Tab Take 1 tablet by mouth daily. 84 tablet 0    escitalopram 10 MG Oral Tab Take 1 tablet (10 mg total) by mouth daily. 90 tablet 3    buPROPion  MG Oral Tablet 24 Hr Take 1 tablet (150 mg total) by mouth daily. (Patient not taking: Reported on 12/19/2024) 90 tablet 0      Past Medical History:  Past Medical History:    Anxiety    Bunion, right      Past Surgical History:  Past Surgical History:   Procedure Laterality Date    Bunionectomy  7/8/2021    bunionectomy      Family History:  History reviewed. No pertinent family history.   Social History:  Social History     Socioeconomic History    Marital status: Single   Tobacco Use    Smoking status: Never    Smokeless tobacco:  Never   Vaping Use    Vaping status: Never Used   Substance and Sexual Activity    Alcohol use: No    Drug use: No    Sexual activity: Not Currently   Other Topics Concern    Caffeine Concern No    Exercise Yes    Seat Belt Yes    Special Diet No    Stress Concern No    Weight Concern No     Social Drivers of Health     Food Insecurity: No Food Insecurity (3/19/2023)    [facility]    Hunger Vital Sign     Worried About Running Out of Food in the Last Year: Never true     Ran Out of Food in the Last Year: Never true       Allergies:  No Known Allergies     Objective:    VITALS: BP 90/58   Pulse 80   Temp 97.6 °F (36.4 °C) (Temporal)   Resp 16   Ht 5' 5\" (1.651 m)   Wt 121 lb (54.9 kg)   LMP 11/26/2024   SpO2 98%   BMI 20.14 kg/m²      BP Readings from Last 3 Encounters:   12/19/24 90/58   08/13/24 100/70   10/16/23 110/78     Wt Readings from Last 3 Encounters:   12/19/24 121 lb (54.9 kg)   08/13/24 127 lb (57.6 kg)   10/16/23 110 lb (49.9 kg)         PHYSICAL EXAM  GEN: pleasant, well-appearing in NAD, AOX3  SKIN: brown flat amcular to the back;  does have one that has irregular borders consistent in color with others.    HEENT: PERRL, EOMI, moist mucous membranes, oropharynx clear, RT Canal with cerumen impaction; LT TM clear , nares patent, no Thyromegaly or nodule.   CV: RRR, no murmurs or abnl heart sounds   PULM: Clear to auscultation, No wheezes, rales, rhonchi.  Non-labored breathing.  ABD: Soft, non-tender, non-distended, + BS, no rigidity/guarding  EXT:  Warm, well perfused, no lower extremity edema  NEURO: CNs grossly intact, no focal weakness  MSK: ~ 2.5cm soft mass to the anterior LE, just lateral to the shin  PSYCH: mood and affect are appropriate    PELVIC EXAM: Chaperone offered and declined.   Vulva: no masses, tenderness or lesions,   Vagina: normal mucosa and rugae, no lesions  Cervix: physiologic  discharge, Cervix was somewhat friable with bleeding with obtaining sample.  Uterus: normal size, shape and nontender  Adnexa: normal size, nontender and no masses.      MENTAL STATUS EXAM  Appearance: groomed appropriately, makes good eye contact  Attitude: cooperative  Motor: No psychomotor agitation/depression   Speech: normal rate and rhythm   Mood: euthymic, stressed about distractability    Affect:  mood congruent  Form of Thought:  Fluid  Thought Content: No SI/HI   Perception: No hallucinations or delusions  Judgement and Insight- Intact         Alyssa Davenport DO

## 2024-12-20 ENCOUNTER — LABORATORY ENCOUNTER (OUTPATIENT)
Dept: LAB | Age: 19
End: 2024-12-20
Attending: FAMILY MEDICINE
Payer: COMMERCIAL

## 2024-12-20 DIAGNOSIS — Z00.01 ENCOUNTER FOR GENERAL ADULT MEDICAL EXAMINATION WITH ABNORMAL FINDINGS: ICD-10-CM

## 2024-12-20 DIAGNOSIS — R53.83 FATIGUE, UNSPECIFIED TYPE: ICD-10-CM

## 2024-12-20 LAB
ALBUMIN SERPL-MCNC: 4 G/DL (ref 3.2–4.8)
ALBUMIN/GLOB SERPL: 1.2 {RATIO} (ref 1–2)
ALP LIVER SERPL-CCNC: 45 U/L
ALT SERPL-CCNC: 11 U/L
ANION GAP SERPL CALC-SCNC: 7 MMOL/L (ref 0–18)
AST SERPL-CCNC: 14 U/L (ref ?–34)
BASOPHILS # BLD AUTO: 0.06 X10(3) UL (ref 0–0.2)
BASOPHILS NFR BLD AUTO: 1.4 %
BILIRUB SERPL-MCNC: 0.6 MG/DL (ref 0.3–1.2)
BUN BLD-MCNC: 13 MG/DL (ref 9–23)
CALCIUM BLD-MCNC: 9.5 MG/DL (ref 8.7–10.4)
CHLORIDE SERPL-SCNC: 108 MMOL/L (ref 98–112)
CHOLEST SERPL-MCNC: 150 MG/DL (ref ?–200)
CO2 SERPL-SCNC: 24 MMOL/L (ref 21–32)
CREAT BLD-MCNC: 0.93 MG/DL
DEPRECATED HBV CORE AB SER IA-ACNC: 8 NG/ML
EGFRCR SERPLBLD CKD-EPI 2021: 90 ML/MIN/1.73M2 (ref 60–?)
EOSINOPHIL # BLD AUTO: 0.11 X10(3) UL (ref 0–0.7)
EOSINOPHIL NFR BLD AUTO: 2.5 %
ERYTHROCYTE [DISTWIDTH] IN BLOOD BY AUTOMATED COUNT: 11.9 %
FASTING PATIENT LIPID ANSWER: YES
FASTING STATUS PATIENT QL REPORTED: YES
GLOBULIN PLAS-MCNC: 3.3 G/DL (ref 2–3.5)
GLUCOSE BLD-MCNC: 85 MG/DL (ref 70–99)
HCT VFR BLD AUTO: 34 %
HDLC SERPL-MCNC: 89 MG/DL (ref 40–59)
HGB BLD-MCNC: 10.9 G/DL
IMM GRANULOCYTES # BLD AUTO: 0.01 X10(3) UL (ref 0–1)
IMM GRANULOCYTES NFR BLD: 0.2 %
LDLC SERPL CALC-MCNC: 47 MG/DL (ref ?–100)
LYMPHOCYTES # BLD AUTO: 1.85 X10(3) UL (ref 1–4)
LYMPHOCYTES NFR BLD AUTO: 42 %
MCH RBC QN AUTO: 31.7 PG (ref 26–34)
MCHC RBC AUTO-ENTMCNC: 32.1 G/DL (ref 31–37)
MCV RBC AUTO: 98.8 FL
MONOCYTES # BLD AUTO: 0.46 X10(3) UL (ref 0.1–1)
MONOCYTES NFR BLD AUTO: 10.5 %
NEUTROPHILS # BLD AUTO: 1.91 X10 (3) UL (ref 1.5–7.7)
NEUTROPHILS # BLD AUTO: 1.91 X10(3) UL (ref 1.5–7.7)
NEUTROPHILS NFR BLD AUTO: 43.4 %
NONHDLC SERPL-MCNC: 61 MG/DL (ref ?–130)
OSMOLALITY SERPL CALC.SUM OF ELEC: 287 MOSM/KG (ref 275–295)
PLATELET # BLD AUTO: 334 10(3)UL (ref 150–450)
POTASSIUM SERPL-SCNC: 4.3 MMOL/L (ref 3.5–5.1)
PROT SERPL-MCNC: 7.3 G/DL (ref 5.7–8.2)
RBC # BLD AUTO: 3.44 X10(6)UL
SODIUM SERPL-SCNC: 139 MMOL/L (ref 136–145)
TRIGL SERPL-MCNC: 70 MG/DL (ref 30–149)
TSI SER-ACNC: 2.06 UIU/ML (ref 0.55–4.78)
VIT B12 SERPL-MCNC: 428 PG/ML (ref 211–911)
VIT D+METAB SERPL-MCNC: 38.3 NG/ML (ref 30–100)
VLDLC SERPL CALC-MCNC: 10 MG/DL (ref 0–30)
WBC # BLD AUTO: 4.4 X10(3) UL (ref 4–11)

## 2024-12-20 PROCEDURE — 82607 VITAMIN B-12: CPT | Performed by: FAMILY MEDICINE

## 2024-12-20 PROCEDURE — 82728 ASSAY OF FERRITIN: CPT | Performed by: FAMILY MEDICINE

## 2024-12-20 PROCEDURE — 80050 GENERAL HEALTH PANEL: CPT | Performed by: FAMILY MEDICINE

## 2024-12-20 PROCEDURE — 82306 VITAMIN D 25 HYDROXY: CPT | Performed by: FAMILY MEDICINE

## 2024-12-20 PROCEDURE — 80061 LIPID PANEL: CPT | Performed by: FAMILY MEDICINE

## 2024-12-31 LAB
.: NORMAL
.: NORMAL

## 2025-01-08 ENCOUNTER — TELEMEDICINE (OUTPATIENT)
Dept: FAMILY MEDICINE CLINIC | Facility: CLINIC | Age: 20
End: 2025-01-08
Payer: COMMERCIAL

## 2025-01-08 DIAGNOSIS — R41.840 ATTENTION AND CONCENTRATION DEFICIT: Primary | ICD-10-CM

## 2025-01-08 PROCEDURE — 98005 SYNCH AUDIO-VIDEO EST LOW 20: CPT | Performed by: FAMILY MEDICINE

## 2025-01-08 RX ORDER — DEXTROAMPHETAMINE SACCHARATE, AMPHETAMINE ASPARTATE MONOHYDRATE, DEXTROAMPHETAMINE SULFATE AND AMPHETAMINE SULFATE 3.75; 3.75; 3.75; 3.75 MG/1; MG/1; MG/1; MG/1
15 CAPSULE, EXTENDED RELEASE ORAL DAILY
Qty: 30 CAPSULE | Refills: 0 | Status: SHIPPED | OUTPATIENT
Start: 2025-01-08 | End: 2025-02-07

## 2025-01-08 RX ORDER — DEXTROAMPHETAMINE SACCHARATE, AMPHETAMINE ASPARTATE MONOHYDRATE, DEXTROAMPHETAMINE SULFATE AND AMPHETAMINE SULFATE 3.75; 3.75; 3.75; 3.75 MG/1; MG/1; MG/1; MG/1
15 CAPSULE, EXTENDED RELEASE ORAL DAILY
Qty: 30 CAPSULE | Refills: 0 | Status: SHIPPED | OUTPATIENT
Start: 2025-02-08 | End: 2025-03-10

## 2025-01-08 RX ORDER — DEXTROAMPHETAMINE SACCHARATE, AMPHETAMINE ASPARTATE, DEXTROAMPHETAMINE SULFATE AND AMPHETAMINE SULFATE 1.25; 1.25; 1.25; 1.25 MG/1; MG/1; MG/1; MG/1
5 TABLET ORAL DAILY
Qty: 30 TABLET | Refills: 0 | Status: SHIPPED | OUTPATIENT
Start: 2025-01-08 | End: 2025-02-06

## 2025-01-08 RX ORDER — DEXTROAMPHETAMINE SACCHARATE, AMPHETAMINE ASPARTATE, DEXTROAMPHETAMINE SULFATE AND AMPHETAMINE SULFATE 1.25; 1.25; 1.25; 1.25 MG/1; MG/1; MG/1; MG/1
5 TABLET ORAL DAILY
Qty: 30 TABLET | Refills: 0 | Status: SHIPPED | OUTPATIENT
Start: 2025-02-08 | End: 2025-03-10

## 2025-01-08 RX ORDER — DEXTROAMPHETAMINE SACCHARATE, AMPHETAMINE ASPARTATE, DEXTROAMPHETAMINE SULFATE AND AMPHETAMINE SULFATE 1.25; 1.25; 1.25; 1.25 MG/1; MG/1; MG/1; MG/1
5 TABLET ORAL DAILY
Qty: 30 TABLET | Refills: 0 | Status: SHIPPED | OUTPATIENT
Start: 2025-03-11 | End: 2025-04-10

## 2025-01-08 RX ORDER — DEXTROAMPHETAMINE SACCHARATE, AMPHETAMINE ASPARTATE MONOHYDRATE, DEXTROAMPHETAMINE SULFATE AND AMPHETAMINE SULFATE 3.75; 3.75; 3.75; 3.75 MG/1; MG/1; MG/1; MG/1
15 CAPSULE, EXTENDED RELEASE ORAL DAILY
Qty: 30 CAPSULE | Refills: 0 | Status: SHIPPED | OUTPATIENT
Start: 2025-03-11 | End: 2025-04-10

## 2025-01-08 NOTE — PROGRESS NOTES
Family Medicine Progress Note  Assessment & Plan:   Follow Up-: Return in about 3 months (around 4/8/2025) for Via Mychart/VV .      Assessment & Plan  Attention and concentration deficit   No Adverse side effects.  Improved focus and anxiety, thought attn could be optimizied.   - Increase Adderall XR to15mg, additional 5mg for PRN use prescribed,  -Aware of behavior modifications  -Aware of safe-handling of controlled substance  -F/U 3 mos    Orders:    Amphetamine-Dextroamphet ER; Take 1 capsule (15 mg total) by mouth daily.  Dispense: 30 capsule; Refill: 0    Amphetamine-Dextroamphet ER; Take 1 capsule (15 mg total) by mouth daily. Fill prescription in 30 days.  Dispense: 30 capsule; Refill: 0    Amphetamine-Dextroamphet ER; Take 1 capsule (15 mg total) by mouth daily. Fill prescription in 61 days.  Dispense: 30 capsule; Refill: 0    Amphetamine-Dextroamphetamine; Take 1 tablet (5 mg total) by mouth daily.  Dispense: 30 tablet; Refill: 0    Amphetamine-Dextroamphetamine; Take 1 tablet (5 mg total) by mouth daily.  Dispense: 30 tablet; Refill: 0    Amphetamine-Dextroamphetamine; Take 1 tablet (5 mg total) by mouth daily.  Dispense: 30 tablet; Refill: 0       Subjective:    History of Present Illness:  History obtained from patient.     Sara Raya is a 19 year old female who presents for ADHD (Medication follow up. ), Anxiety, and Depression     FATIGUE/FOCUS-  finding that she has been cosnistently having difficulty with focus.  Has progressively been an issue over the past couple years, but she feels like this past semester it was really bad;  routinely distracted in class/on tests-- has to reread questions mulltiple times, or when she's on a rotation and multiple people are talking she has a really hard time following the conversation,w ill get overwhelmed and then zone out---- this impacts her at school and in outside of school relationships.    Looses items;  distracted while driving--- no accidents, has been  trying work-arounds, doesn't necessarily feel like Wellbutirn was helpful   Always tired, regardless of sleep; Getting 9 hours of sleep.   Routine exercise, Diet is healthy,   Can't focus and easy distraction. NCLEX upcoming.    Doesn't necessarily feel like the Lexapro is causing issues.    - since last visit she feels like she has overall noticed significant improvement in underlying Anxiety with better mgmt of her focus, no notbale AE. Though she does feel hope could eb adjusted.     FU ANXIETY   Meds:Lexapro 10mg   Mood: good, doing well with clinicals, would   Suicidal/Homicidal Thoughts: denies   Counseling: None   Sleep:  no concerns     Dysmenorrhea- OCPs for mgmt.  Situational Anxiety-Lexparo    Pmhx: SARA  Pshx: Bunionectomy   All: NKDA;   Meds: as below  Fam hx: family history is not on file.   Soc hx: no tob/occ etoh/no illicits; [employer], [employer]. Sexually active in the past;   Ob/gyne: Patient's last menstrual period was 12/10/2024.. OCPs ,  ,     History/Other:   ROS-Per HPI     Problem List:  Patient Active Problem List   Diagnosis    SARA (generalized anxiety disorder)    Attention and concentration deficit       Current Medications:  Current Outpatient Medications   Medication Sig Dispense Refill    Amphetamine-Dextroamphet ER (ADDERALL XR) 15 MG Oral Capsule SR 24 Hr Take 1 capsule (15 mg total) by mouth daily. 30 capsule 0    [START ON 2025] Amphetamine-Dextroamphet ER (ADDERALL XR) 15 MG Oral Capsule SR 24 Hr Take 1 capsule (15 mg total) by mouth daily. Fill prescription in 30 days. 30 capsule 0    [START ON 3/11/2025] Amphetamine-Dextroamphet ER (ADDERALL XR) 15 MG Oral Capsule SR 24 Hr Take 1 capsule (15 mg total) by mouth daily. Fill prescription in 61 days. 30 capsule 0    amphetamine-dextroamphetamine 5 MG Oral Tab Take 1 tablet (5 mg total) by mouth daily. 30 tablet 0    [START ON 2025] amphetamine-dextroamphetamine 5 MG Oral Tab Take 1 tablet (5 mg total) by mouth daily.  30 tablet 0    [START ON 3/11/2025] amphetamine-dextroamphetamine 5 MG Oral Tab Take 1 tablet (5 mg total) by mouth daily. 30 tablet 0    Ferrous Sulfate (IRON) 325 (65 Fe) MG Oral Tab Take 325 mg by mouth daily. Take with Vitamin C (orange juice) to improve absorption 90 tablet 1    Norethin Ace-Eth Estrad-FE (BLISOVI FE 1/20) 1-20 MG-MCG Oral Tab Take 1 tablet by mouth daily. 84 tablet 3    escitalopram 10 MG Oral Tab Take 1 tablet (10 mg total) by mouth daily. 90 tablet 3      Past Medical History:  Past Medical History:    Anxiety    Bunion, right      Past Surgical History:  Past Surgical History:   Procedure Laterality Date    Bunionectomy  7/8/2021    bunionectomy      Family History:  History reviewed. No pertinent family history.   Social History:  Social History     Socioeconomic History    Marital status: Single   Tobacco Use    Smoking status: Never    Smokeless tobacco: Never   Vaping Use    Vaping status: Never Used   Substance and Sexual Activity    Alcohol use: No    Drug use: No    Sexual activity: Not Currently   Other Topics Concern    Caffeine Concern No    Exercise Yes    Seat Belt Yes    Special Diet No    Stress Concern No    Weight Concern No     Social Drivers of Health     Food Insecurity: No Food Insecurity (3/19/2023)    Received from [facility], [facility], [facility]    Hunger Vital Sign     Worried About Running Out of Food in the Last Year: Never true     Ran Out of Food in the Last Year: Never true       Allergies:  No Known Allergies     Objective:      VITALS/ PHYSICAL EXAM-   alert, appears stated age, and cooperative, Normocephalic, without obvious abnormality, atraumatic, lips, mucosa, and tongue normal; teeth and gums normal, and Speaking in full sentences comfortably  MENTAL STATUS EXAM  Appearance: groomed appropriately, makes good eye contact  Attitude: cooperative  Motor: No psychomotor agitation/depression   Speech: normal  rate and rhythm   Mood: Euthymic   Affect:  mood congruent  Form of Thought:  Fluid  Thought Content: No SI/HI   Perception: No hallucinations or delusions  Judgement and Insight- Intact      This visit is conducted using Telemedicine with live, interactive video and audio, thus VS not obtained and Physical exam not done.      Sara Raya understands phone evaluation is not a substitute for face-to-face examination or emergency care. Patient advised to go to ER or call 911 for worsening symptoms or acute distress.     Please note that the following visit was completed using two-way, real-time interactive audio and video communication.  This has been done in good [place] to provide continuity of care in the best interest of the provider-patient relationship.    There are limitations of this visit as no physical exam could be performed. This billing visit was spent on reviewing labs, medications, radiology tests and decision making.  Appropriate medical decision-making and tests are ordered as detailed in the plan of care above.     Patient has been referred to the [facility] website at [email or web address] to review the yearly Consent to Treat document.    Patient understands and accepts financial responsibility for any deductible, co-insurance and/or co-pays associated with this service.        Alyssa Davenport DO  01/08/25 8:16 AM

## 2025-01-08 NOTE — ASSESSMENT & PLAN NOTE
No Adverse side effects.  Improved focus and anxiety, thought attn could be optimizied.   - Increase Adderall XR to15mg, additional 5mg for PRN use prescribed,  -Aware of behavior modifications  -Aware of safe-handling of controlled substance  -F/U 3 mos    Orders:    Amphetamine-Dextroamphet ER; Take 1 capsule (15 mg total) by mouth daily.  Dispense: 30 capsule; Refill: 0    Amphetamine-Dextroamphet ER; Take 1 capsule (15 mg total) by mouth daily. Fill prescription in 30 days.  Dispense: 30 capsule; Refill: 0    Amphetamine-Dextroamphet ER; Take 1 capsule (15 mg total) by mouth daily. Fill prescription in 61 days.  Dispense: 30 capsule; Refill: 0    Amphetamine-Dextroamphetamine; Take 1 tablet (5 mg total) by mouth daily.  Dispense: 30 tablet; Refill: 0    Amphetamine-Dextroamphetamine; Take 1 tablet (5 mg total) by mouth daily.  Dispense: 30 tablet; Refill: 0    Amphetamine-Dextroamphetamine; Take 1 tablet (5 mg total) by mouth daily.  Dispense: 30 tablet; Refill: 0

## 2025-03-07 ENCOUNTER — TELEMEDICINE (OUTPATIENT)
Dept: FAMILY MEDICINE CLINIC | Facility: CLINIC | Age: 20
End: 2025-03-07
Payer: COMMERCIAL

## 2025-03-07 DIAGNOSIS — R41.840 ATTENTION AND CONCENTRATION DEFICIT: ICD-10-CM

## 2025-03-07 DIAGNOSIS — R41.840 ATTENTION AND CONCENTRATION DEFICIT: Primary | ICD-10-CM

## 2025-03-07 PROCEDURE — 98005 SYNCH AUDIO-VIDEO EST LOW 20: CPT | Performed by: FAMILY MEDICINE

## 2025-03-07 RX ORDER — DEXTROAMPHETAMINE SACCHARATE, AMPHETAMINE ASPARTATE, DEXTROAMPHETAMINE SULFATE AND AMPHETAMINE SULFATE 1.25; 1.25; 1.25; 1.25 MG/1; MG/1; MG/1; MG/1
5 TABLET ORAL DAILY
Qty: 30 TABLET | Refills: 0 | Status: SHIPPED | OUTPATIENT
Start: 2025-03-07 | End: 2025-04-05

## 2025-03-07 RX ORDER — DEXTROAMPHETAMINE SACCHARATE, AMPHETAMINE ASPARTATE, DEXTROAMPHETAMINE SULFATE AND AMPHETAMINE SULFATE 1.25; 1.25; 1.25; 1.25 MG/1; MG/1; MG/1; MG/1
5 TABLET ORAL DAILY
Qty: 30 TABLET | Refills: 0 | Status: SHIPPED | OUTPATIENT
Start: 2025-04-07 | End: 2025-05-07

## 2025-03-07 RX ORDER — LIDOCAINE HYDROCHLORIDE 20 MG/ML
SOLUTION ORAL; TOPICAL
COMMUNITY
Start: 2024-12-26

## 2025-03-07 RX ORDER — DEXTROAMPHETAMINE SACCHARATE, AMPHETAMINE ASPARTATE MONOHYDRATE, DEXTROAMPHETAMINE SULFATE AND AMPHETAMINE SULFATE 6.25; 6.25; 6.25; 6.25 MG/1; MG/1; MG/1; MG/1
25 CAPSULE, EXTENDED RELEASE ORAL DAILY
Qty: 30 CAPSULE | Refills: 0 | Status: SHIPPED | OUTPATIENT
Start: 2025-04-07 | End: 2025-05-08

## 2025-03-07 RX ORDER — DEXTROAMPHETAMINE SACCHARATE, AMPHETAMINE ASPARTATE, DEXTROAMPHETAMINE SULFATE AND AMPHETAMINE SULFATE 1.25; 1.25; 1.25; 1.25 MG/1; MG/1; MG/1; MG/1
5 TABLET ORAL DAILY
Qty: 30 TABLET | Refills: 0 | Status: SHIPPED | OUTPATIENT
Start: 2025-05-08 | End: 2025-06-07

## 2025-03-07 RX ORDER — DEXTROAMPHETAMINE SACCHARATE, AMPHETAMINE ASPARTATE MONOHYDRATE, DEXTROAMPHETAMINE SULFATE AND AMPHETAMINE SULFATE 6.25; 6.25; 6.25; 6.25 MG/1; MG/1; MG/1; MG/1
25 CAPSULE, EXTENDED RELEASE ORAL DAILY
Qty: 30 CAPSULE | Refills: 0 | Status: SHIPPED | OUTPATIENT
Start: 2025-03-07 | End: 2025-04-06

## 2025-03-07 RX ORDER — DEXTROAMPHETAMINE SACCHARATE, AMPHETAMINE ASPARTATE MONOHYDRATE, DEXTROAMPHETAMINE SULFATE AND AMPHETAMINE SULFATE 6.25; 6.25; 6.25; 6.25 MG/1; MG/1; MG/1; MG/1
25 CAPSULE, EXTENDED RELEASE ORAL DAILY
Qty: 30 CAPSULE | Refills: 0 | Status: SHIPPED | OUTPATIENT
Start: 2025-05-08 | End: 2025-06-07

## 2025-03-07 NOTE — ASSESSMENT & PLAN NOTE
Stable weight on current meds;  No Adverse side effects.  Short-lived benefit.    - Increase Adderall XR to 25mg   - Contin with IR 5mg dosing.   -Aware of behavior modifications  -Aware of safe-handling of controlled substance  -F/U 3 mos    Orders:    Amphetamine-Dextroamphet ER; Take 1 capsule (25 mg total) by mouth daily.  Dispense: 30 capsule; Refill: 0    Amphetamine-Dextroamphet ER; Take 1 capsule (25 mg total) by mouth daily.  Dispense: 30 capsule; Refill: 0    Amphetamine-Dextroamphet ER; Take 1 capsule (25 mg total) by mouth daily.  Dispense: 30 capsule; Refill: 0    Amphetamine-Dextroamphetamine; Take 1 tablet (5 mg total) by mouth daily.  Dispense: 30 tablet; Refill: 0    Amphetamine-Dextroamphetamine; Take 1 tablet (5 mg total) by mouth daily.  Dispense: 30 tablet; Refill: 0    Amphetamine-Dextroamphetamine; Take 1 tablet (5 mg total) by mouth daily.  Dispense: 30 tablet; Refill: 0

## 2025-03-07 NOTE — PROGRESS NOTES
Family Medicine Progress Note  Assessment & Plan:   Follow Up-: Return in about 3 months (around 6/7/2025) for F/U ADHD, VV okay.      Assessment & Plan  Attention and concentration deficit  Stable weight on current meds;  No Adverse side effects.  Short-lived benefit.    - Increase Adderall XR to 25mg   - Contin with IR 5mg dosing.   -Aware of behavior modifications  -Aware of safe-handling of controlled substance  -F/U 3 mos    Orders:    Amphetamine-Dextroamphet ER; Take 1 capsule (25 mg total) by mouth daily.  Dispense: 30 capsule; Refill: 0    Amphetamine-Dextroamphet ER; Take 1 capsule (25 mg total) by mouth daily.  Dispense: 30 capsule; Refill: 0    Amphetamine-Dextroamphet ER; Take 1 capsule (25 mg total) by mouth daily.  Dispense: 30 capsule; Refill: 0    Amphetamine-Dextroamphetamine; Take 1 tablet (5 mg total) by mouth daily.  Dispense: 30 tablet; Refill: 0    Amphetamine-Dextroamphetamine; Take 1 tablet (5 mg total) by mouth daily.  Dispense: 30 tablet; Refill: 0    Amphetamine-Dextroamphetamine; Take 1 tablet (5 mg total) by mouth daily.  Dispense: 30 tablet; Refill: 0       Subjective:    History of Present Illness:  History obtained from patient.     Sara Raya is a 19 year old female who presents for ADHD (Medication follow up. )     FATIGUE/FOCUS-  finding that she has been cosnistently having difficulty with focus.  Has progressively been an issue over the past couple years, but she feels like this past semester it was really bad;  routinely distracted in class/on tests-- has to reread questions mulltiple times, or when she's on a rotation and multiple people are talking she has a really hard time following the conversation,w ill get overwhelmed and then zone out---- this impacts her at school and in outside of school relationships.    Looses items;  distracted while driving--- no accidents, has been trying work-arounds, doesn't necessarily feel like Wellbutirn was helpful   Always tired,  regardless of sleep; Getting 9 hours of sleep.   Routine exercise, Diet is healthy,   Can't focus and easy distraction. NCLEX upcoming.    Doesn't necessarily feel like the Lexapro is causing issues.    - since last visit she feels like she has overall noticed significant improvement in underlying Anxiety with better mgmt of her focus, no notbale AE. Though she does feel hope could eb adjusted.   23--- Empiric trial of Adderall for Attn in Dec,  dose increased in .  Feels improvement but not much.  Does feel effect, but wears off after 3-4 hours.  No AE.  IR is helpful but very short-lived.      FU ANXIETY   Meds:Lexapro 10mg   Mood: good, doing well with clinicals, would   Suicidal/Homicidal Thoughts: denies   Counseling: None   Sleep:  no concerns     Dysmenorrhea- OCPs for mgmt.  Situational Anxiety-Lexparo    Pmhx: ASRA  Pshx: Bunionectomy   All: NKDA;   Meds: as below  Fam hx: family history is not on file.   Soc hx: no tob/occ etoh/no illicits; [employer] [place], [employer]. Sexually active in the past;   Ob/gyne: Patient's last menstrual period was 2025.. OCPs ,  ,     History/Other:   ROS-Per HPI     Problem List:  Patient Active Problem List   Diagnosis    SARA (generalized anxiety disorder)    Attention and concentration deficit       Current Medications:  Current Outpatient Medications   Medication Sig Dispense Refill    FEROSUL 325 (65 Fe) MG Oral Tab Take 1 tablet by mouth daily. Take with Vitamin C (orange juice) to improve absorption      Amphetamine-Dextroamphet ER (ADDERALL XR) 25 MG Oral Capsule SR 24 Hr Take 1 capsule (25 mg total) by mouth daily. 30 capsule 0    [START ON 2025] Amphetamine-Dextroamphet ER (ADDERALL XR) 25 MG Oral Capsule SR 24 Hr Take 1 capsule (25 mg total) by mouth daily. 30 capsule 0    [START ON 2025] Amphetamine-Dextroamphet ER (ADDERALL XR) 25 MG Oral Capsule SR 24 Hr Take 1 capsule (25 mg total) by mouth daily. 30 capsule 0     amphetamine-dextroamphetamine 5 MG Oral Tab Take 1 tablet (5 mg total) by mouth daily. 30 tablet 0    [START ON 4/7/2025] amphetamine-dextroamphetamine 5 MG Oral Tab Take 1 tablet (5 mg total) by mouth daily. 30 tablet 0    [START ON 5/8/2025] amphetamine-dextroamphetamine 5 MG Oral Tab Take 1 tablet (5 mg total) by mouth daily. 30 tablet 0    amphetamine-dextroamphetamine 5 MG Oral Tab Take 1 tablet (5 mg total) by mouth daily. 30 tablet 0    Norethin Ace-Eth Estrad-FE (BLISOVI FE 1/20) 1-20 MG-MCG Oral Tab Take 1 tablet by mouth daily. 84 tablet 3    escitalopram 10 MG Oral Tab Take 1 tablet (10 mg total) by mouth daily. 90 tablet 3      Past Medical History:  Past Medical History:    Anxiety    Bunion, right      Past Surgical History:  Past Surgical History:   Procedure Laterality Date    Bunionectomy  7/8/2021    bunionectomy      Family History:  History reviewed. No pertinent family history.   Social History:  Social History     Socioeconomic History    Marital status: Single   Tobacco Use    Smoking status: Never    Smokeless tobacco: Never   Vaping Use    Vaping status: Never Used   Substance and Sexual Activity    Alcohol use: No    Drug use: No    Sexual activity: Not Currently   Other Topics Concern    Caffeine Concern No    Exercise Yes    Seat Belt Yes    Special Diet No    Stress Concern No    Weight Concern No     Social Drivers of Health     Food Insecurity: No Food Insecurity (3/19/2023)    [facility]    Hunger Vital Sign     Worried About Running Out of Food in the Last Year: Never true     Ran Out of Food in the Last Year: Never true       Allergies:  No Known Allergies     Objective:      VITALS/ PHYSICAL EXAM-   alert, appears stated age, and cooperative, Normocephalic, without obvious abnormality, atraumatic, lips, mucosa, and tongue normal; teeth and gums normal, and Speaking in full sentences  comfortably  MENTAL STATUS EXAM  Appearance: groomed appropriately, makes good eye contact  Attitude: cooperative  Motor: No psychomotor agitation/depression   Speech: normal rate and rhythm   Mood: Euthymic   Affect:  mood congruent  Form of Thought:  Fluid  Thought Content: No SI/HI   Perception: No hallucinations or delusions  Judgement and Insight- Intact      This visit is conducted using Telemedicine with live, interactive video and audio, thus VS not obtained and Physical exam not done.      Sara aRya understands phone evaluation is not a substitute for face-to-face examination or emergency care. Patient advised to go to ER or call 911 for worsening symptoms or acute distress.     Please note that the following visit was completed using two-way, real-time interactive audio and video communication.  This has been done in good [place] to provide continuity of care in the best interest of the provider-patient relationship.    There are limitations of this visit as no physical exam could be performed. This billing visit was spent on reviewing labs, medications, radiology tests and decision making.  Appropriate medical decision-making and tests are ordered as detailed in the plan of care above.     Patient has been referred to the [facility] website at [email or web address] to review the yearly Consent to Treat document.    Patient understands and accepts financial responsibility for any deductible, co-insurance and/or co-pays associated with this service.        Alyssa Davenport DO  3/7/25

## 2025-03-11 RX ORDER — DEXTROAMPHETAMINE SACCHARATE, AMPHETAMINE ASPARTATE MONOHYDRATE, DEXTROAMPHETAMINE SULFATE AND AMPHETAMINE SULFATE 6.25; 6.25; 6.25; 6.25 MG/1; MG/1; MG/1; MG/1
25 CAPSULE, EXTENDED RELEASE ORAL DAILY
Qty: 30 CAPSULE | Refills: 0 | OUTPATIENT
Start: 2025-03-11 | End: 2025-04-10

## 2025-03-11 NOTE — TELEPHONE ENCOUNTER
Called Rosie Prather, the pharmacist confirmed patient picked up adderall XR 25mg and adderall 5mg on Friday 3/7/25.

## 2025-07-01 ENCOUNTER — PATIENT MESSAGE (OUTPATIENT)
Dept: FAMILY MEDICINE CLINIC | Facility: CLINIC | Age: 20
End: 2025-07-01

## 2025-07-01 DIAGNOSIS — R41.840 ATTENTION AND CONCENTRATION DEFICIT: ICD-10-CM

## 2025-07-02 ENCOUNTER — TELEPHONE (OUTPATIENT)
Dept: FAMILY MEDICINE CLINIC | Facility: CLINIC | Age: 20
End: 2025-07-02

## 2025-07-02 NOTE — TELEPHONE ENCOUNTER
Patient called and requesting VV visit appoint for ADHD medication before July 14th , no slots available until July 21st. Pt leaving to [place] on July 14 2015. Please advise?

## 2025-07-08 ENCOUNTER — TELEMEDICINE (OUTPATIENT)
Dept: FAMILY MEDICINE CLINIC | Facility: CLINIC | Age: 20
End: 2025-07-08
Payer: COMMERCIAL

## 2025-07-08 DIAGNOSIS — Z30.41 ENCOUNTER FOR SURVEILLANCE OF CONTRACEPTIVE PILLS: ICD-10-CM

## 2025-07-08 DIAGNOSIS — R41.840 ATTENTION AND CONCENTRATION DEFICIT: Primary | ICD-10-CM

## 2025-07-08 DIAGNOSIS — F41.1 GAD (GENERALIZED ANXIETY DISORDER): ICD-10-CM

## 2025-07-08 DIAGNOSIS — N94.6 DYSMENORRHEA: ICD-10-CM

## 2025-07-08 PROCEDURE — 98005 SYNCH AUDIO-VIDEO EST LOW 20: CPT | Performed by: FAMILY MEDICINE

## 2025-07-08 RX ORDER — LIDOCAINE HYDROCHLORIDE 20 MG/ML
325 SOLUTION ORAL; TOPICAL
Qty: 90 TABLET | Refills: 3 | Status: SHIPPED | OUTPATIENT
Start: 2025-07-08

## 2025-07-08 RX ORDER — DEXTROAMPHETAMINE SACCHARATE, AMPHETAMINE ASPARTATE MONOHYDRATE, DEXTROAMPHETAMINE SULFATE AND AMPHETAMINE SULFATE 6.25; 6.25; 6.25; 6.25 MG/1; MG/1; MG/1; MG/1
25 CAPSULE, EXTENDED RELEASE ORAL DAILY
Qty: 30 CAPSULE | Refills: 0 | Status: SHIPPED | OUTPATIENT
Start: 2025-08-08 | End: 2025-09-08

## 2025-07-08 RX ORDER — DEXTROAMPHETAMINE SACCHARATE, AMPHETAMINE ASPARTATE MONOHYDRATE, DEXTROAMPHETAMINE SULFATE AND AMPHETAMINE SULFATE 6.25; 6.25; 6.25; 6.25 MG/1; MG/1; MG/1; MG/1
25 CAPSULE, EXTENDED RELEASE ORAL DAILY
Qty: 30 CAPSULE | Refills: 0 | Status: SHIPPED | OUTPATIENT
Start: 2025-09-08 | End: 2025-10-08

## 2025-07-08 RX ORDER — DEXTROAMPHETAMINE SACCHARATE, AMPHETAMINE ASPARTATE MONOHYDRATE, DEXTROAMPHETAMINE SULFATE AND AMPHETAMINE SULFATE 6.25; 6.25; 6.25; 6.25 MG/1; MG/1; MG/1; MG/1
25 CAPSULE, EXTENDED RELEASE ORAL DAILY
Qty: 30 CAPSULE | Refills: 0 | Status: SHIPPED | OUTPATIENT
Start: 2025-07-08 | End: 2025-08-07

## 2025-07-08 RX ORDER — DEXTROAMPHETAMINE SACCHARATE, AMPHETAMINE ASPARTATE, DEXTROAMPHETAMINE SULFATE AND AMPHETAMINE SULFATE 2.5; 2.5; 2.5; 2.5 MG/1; MG/1; MG/1; MG/1
10 TABLET ORAL DAILY
Qty: 30 TABLET | Refills: 0 | Status: SHIPPED | OUTPATIENT
Start: 2025-08-08 | End: 2025-09-07

## 2025-07-08 RX ORDER — ESCITALOPRAM OXALATE 10 MG/1
10 TABLET ORAL DAILY
Qty: 90 TABLET | Refills: 3 | Status: SHIPPED | OUTPATIENT
Start: 2025-07-08

## 2025-07-08 RX ORDER — DEXTROAMPHETAMINE SACCHARATE, AMPHETAMINE ASPARTATE, DEXTROAMPHETAMINE SULFATE AND AMPHETAMINE SULFATE 2.5; 2.5; 2.5; 2.5 MG/1; MG/1; MG/1; MG/1
10 TABLET ORAL DAILY
Qty: 30 TABLET | Refills: 0 | Status: SHIPPED | OUTPATIENT
Start: 2025-07-08 | End: 2025-08-07

## 2025-07-08 RX ORDER — DEXTROAMPHETAMINE SACCHARATE, AMPHETAMINE ASPARTATE, DEXTROAMPHETAMINE SULFATE AND AMPHETAMINE SULFATE 2.5; 2.5; 2.5; 2.5 MG/1; MG/1; MG/1; MG/1
10 TABLET ORAL DAILY
Qty: 30 TABLET | Refills: 0 | Status: SHIPPED | OUTPATIENT
Start: 2025-09-08 | End: 2025-10-08

## 2025-07-08 RX ORDER — ONDANSETRON 4 MG/1
4 TABLET, ORALLY DISINTEGRATING ORAL EVERY 8 HOURS PRN
Qty: 20 TABLET | Refills: 1 | Status: SHIPPED | OUTPATIENT
Start: 2025-07-08

## 2025-07-08 RX ORDER — NORETHINDRONE ACETATE AND ETHINYL ESTRADIOL 1MG-20(21)
1 KIT ORAL DAILY
Qty: 84 TABLET | Refills: 3 | Status: SHIPPED | OUTPATIENT
Start: 2025-07-08

## 2025-07-08 NOTE — ASSESSMENT & PLAN NOTE
Stable weight on current meds;  No Adverse side effects.  5mg IR dosing not effective,   -Contin Adderall XR to 25mg   -Increase IR to 10mg   -Aware of behavior modifications  -Aware of safe-handling of controlled substance  -F/U 3 mos    Orders:    Amphetamine-Dextroamphet ER; Take 1 capsule (25 mg total) by mouth daily.  Dispense: 30 capsule; Refill: 0    Amphetamine-Dextroamphet ER; Take 1 capsule (25 mg total) by mouth daily.  Dispense: 30 capsule; Refill: 0    Amphetamine-Dextroamphet ER; Take 1 capsule (25 mg total) by mouth daily.  Dispense: 30 capsule; Refill: 0    Amphetamine-Dextroamphetamine; Take 1 tablet (10 mg total) by mouth daily.  Dispense: 30 tablet; Refill: 0    Amphetamine-Dextroamphetamine; Take 1 tablet (10 mg total) by mouth daily.  Dispense: 30 tablet; Refill: 0    Amphetamine-Dextroamphetamine; Take 1 tablet (10 mg total) by mouth daily.  Dispense: 30 tablet; Refill: 0

## 2025-07-08 NOTE — PROGRESS NOTES
Family Medicine Progress Note  Assessment & Plan:   Follow Up-: Return in about 3 months (around 10/8/2025) for F/U ADHD F2F .      Assessment & Plan  Attention and concentration deficit  Stable weight on current meds;  No Adverse side effects.  5mg IR dosing not effective,   -Contin Adderall XR to 25mg   -Increase IR to 10mg   -Aware of behavior modifications  -Aware of safe-handling of controlled substance  -F/U 3 mos    Orders:    Amphetamine-Dextroamphet ER; Take 1 capsule (25 mg total) by mouth daily.  Dispense: 30 capsule; Refill: 0    Amphetamine-Dextroamphet ER; Take 1 capsule (25 mg total) by mouth daily.  Dispense: 30 capsule; Refill: 0    Amphetamine-Dextroamphet ER; Take 1 capsule (25 mg total) by mouth daily.  Dispense: 30 capsule; Refill: 0    Amphetamine-Dextroamphetamine; Take 1 tablet (10 mg total) by mouth daily.  Dispense: 30 tablet; Refill: 0    Amphetamine-Dextroamphetamine; Take 1 tablet (10 mg total) by mouth daily.  Dispense: 30 tablet; Refill: 0    Amphetamine-Dextroamphetamine; Take 1 tablet (10 mg total) by mouth daily.  Dispense: 30 tablet; Refill: 0    Dysmenorrhea  Encounter for surveillance of contraceptive pills  Chronic, Stable, contin meds as below   Orders:    Norethin Ace-Eth Estrad-FE; Take 1 tablet by mouth daily.  Dispense: 84 tablet; Refill: 3     SARA (generalized anxiety disorder)  Patient describes mood as Euthymic.  Meds controlling symptoms based on GAD7.   -Contin Lexapro   -Aware of importance of adequate sleep, nutrition, and physical activity.   -Aware of ER Precautions for SI/HI.  Ant to Return to clinic if develop worsening symptoms or adverse effects from RX therapy.    Orders:    Escitalopram Oxalate; Take 1 tablet (10 mg total) by mouth daily.  Dispense: 90 tablet; Refill: 3       Subjective:    History of Present Illness:  History obtained from patient.     Sara Raya is a 20 year old female who presents for ADHD          FATIGUE/FOCUS-  finding that she has  been cosnistently having difficulty with focus.  Has progressively been an issue over the past couple years, but she feels like this past semester it was really bad;  routinely distracted in class/on tests-- has to reread questions mulltiple times, or when she's on a rotation and multiple people are talking she has a really hard time following the conversation,w ill get overwhelmed and then zone out---- this impacts her at school and in outside of school relationships.    Looses items;  distracted while driving--- no accidents, has been trying work-arounds, doesn't necessarily feel like Wellbutirn was helpful   Always tired, regardless of sleep; Getting 9 hours of sleep.   Routine exercise, Diet is healthy,   Can't focus and easy distraction. NCLEX upcoming.    Doesn't necessarily feel like the Lexapro is causing issues.    - since last visit she feels like she has overall noticed significant improvement in underlying Anxiety with better mgmt of her focus, no notbale AE. Though she does feel hope could eb adjusted.   23--- Empiric trial of Adderall for Attn in Dec,  dose increased in .  Feels improvement but not much.  Does feel effect, but wears off after 3-4 hours.  No AE.  IR is helpful but very short-lived.    25- Doing well on current RX, though she feels the IR 5mg is too low and 10mg has been more effective.  Studying for her NCLEX, then will be doing her grad trip in [facility] and meeting her family in [facility].      FU ANXIETY   Meds:Lexapro 10mg   Mood: good, doing well with clinicals, would   Suicidal/Homicidal Thoughts: denies   Counseling: None   Sleep:  no concerns     Dysmenorrhea- OCPs for mgmt.  Situational Anxiety-Lexparo    Pmhx: SARA  Pshx: Bunionectomy   All: NKDA;   Meds: as below  Fam hx: family history is not on file.   Soc hx: no tob/occ etoh/no illicits; [employer], [employer]. Sexually active in the past;   Ob/gyne: Patient's last menstrual period was 2025.. OCPs ,   ,     History/Other:   ROS-Per HPI     Problem List:  Patient Active Problem List   Diagnosis    SARA (generalized anxiety disorder)    Attention and concentration deficit       Current Medications:  Current Outpatient Medications   Medication Sig Dispense Refill    Norethin Ace-Eth Estrad-FE (BLISOVI FE 1/20) 1-20 MG-MCG Oral Tab Take 1 tablet by mouth daily. 84 tablet 3    escitalopram 10 MG Oral Tab Take 1 tablet (10 mg total) by mouth daily. 90 tablet 3    FEROSUL 325 (65 Fe) MG Oral Tab Take 1 tablet (325 mg total) by mouth daily with breakfast. 90 tablet 3    Amphetamine-Dextroamphet ER (ADDERALL XR) 25 MG Oral Capsule SR 24 Hr Take 1 capsule (25 mg total) by mouth daily. 30 capsule 0    [START ON 8/8/2025] Amphetamine-Dextroamphet ER (ADDERALL XR) 25 MG Oral Capsule SR 24 Hr Take 1 capsule (25 mg total) by mouth daily. 30 capsule 0    [START ON 9/8/2025] Amphetamine-Dextroamphet ER (ADDERALL XR) 25 MG Oral Capsule SR 24 Hr Take 1 capsule (25 mg total) by mouth daily. 30 capsule 0    amphetamine-dextroamphetamine (ADDERALL) 10 MG Oral Tab Take 1 tablet (10 mg total) by mouth daily. 30 tablet 0    [START ON 8/8/2025] amphetamine-dextroamphetamine (ADDERALL) 10 MG Oral Tab Take 1 tablet (10 mg total) by mouth daily. 30 tablet 0    [START ON 9/8/2025] amphetamine-dextroamphetamine (ADDERALL) 10 MG Oral Tab Take 1 tablet (10 mg total) by mouth daily. 30 tablet 0    ondansetron 4 MG Oral Tablet Dispersible Take 1 tablet (4 mg total) by mouth every 8 (eight) hours as needed for Nausea. 20 tablet 1      Past Medical History:  Past Medical History:    Anxiety    Bunion, right      Past Surgical History:  Past Surgical History:   Procedure Laterality Date    Bunionectomy  7/8/2021    bunionectomy      Family History:  History reviewed. No pertinent family history.   Social History:  Social History     Socioeconomic History    Marital status: Single   Tobacco Use    Smoking status: Never    Smokeless tobacco: Never    Vaping Use    Vaping status: Never Used   Substance and Sexual Activity    Alcohol use: No    Drug use: No    Sexual activity: Not Currently   Other Topics Concern    Caffeine Concern No    Exercise No    Seat Belt No    Special Diet No    Stress Concern No    Weight Concern No     Social Drivers of Health     Food Insecurity: No Food Insecurity (3/19/2023)    Received [facility], [facility]    Hunger Vital Sign     Worried About Running Out of Food in the Last Year: Never true     Ran Out of Food in the Last Year: Never true       Allergies:  No Known Allergies     Objective:      VITALS/ PHYSICAL EXAM-   alert, appears stated age, and cooperative, Normocephalic, without obvious abnormality, atraumatic, lips, mucosa, and tongue normal; teeth and gums normal, and Speaking in full sentences comfortably  MENTAL STATUS EXAM  Appearance: groomed appropriately, makes good eye contact  Attitude: cooperative  Motor: No psychomotor agitation/depression   Speech: normal rate and rhythm   Mood: Euthymic   Affect:  mood congruent  Form of Thought:  Fluid  Thought Content: No SI/HI   Perception: No hallucinations or delusions  Judgement and Insight- Intact      This visit is conducted using Telemedicine with live, interactive video and audio, thus VS not obtained and Physical exam not done.      Sara Raya understands phone evaluation is not a substitute for face-to-face examination or emergency care. Patient advised to go to ER or call 911 for worsening symptoms or acute distress.     Please note that the following visit was completed using two-way, real-time interactive audio and video communication.  This has been done in good [place] to provide continuity of care in the best interest of the provider-patient relationship.    There are limitations of this visit as no physical exam could be performed. This billing visit was spent on reviewing labs, medications, radiology tests and decision making.   Appropriate medical decision-making and tests are ordered as detailed in the plan of care above.     Patient has been referred to the [facility] website at [email or web address] to review the yearly Consent to Treat document.    Patient understands and accepts financial responsibility for any deductible, co-insurance and/or co-pays associated with this service.       Alyssa Davenport DO    07/08/25 5:01 PM

## 2025-07-08 NOTE — ASSESSMENT & PLAN NOTE
Patient describes mood as Euthymic.  Meds controlling symptoms based on GAD7.   -Contin Lexapro   -Aware of importance of adequate sleep, nutrition, and physical activity.   -Aware of ER Precautions for SI/HI.  Ant to Return to clinic if develop worsening symptoms or adverse effects from RX therapy.    Orders:    Escitalopram Oxalate; Take 1 tablet (10 mg total) by mouth daily.  Dispense: 90 tablet; Refill: 3

## (undated) NOTE — LETTER
Name:  Ashtyn Olmos Year:  10th Grade Class: Student ID No.:   Address:  [address] [place] [address] Phone:  [phone] (home) [phone] (work) :  11year old   Name Relationship [address] Work Phone Home Phone Mobile Phone polymorphic ventricular tachycardia? No   15. Does anyone in your family have a heart problem, pacemaker, or implanted defibrillator? No   16. Has anyone in your family had unexplained fainting, seizures, or near drowning?  No   BONE AND JOINT QUESTIONS 37. Do you have headaches with exercise? No   38. Have you ever had numbness, tingling, or weakness in your arms or legs after being hit or falling? No   39. Have you ever been unable to move your arms / legs after being hit /fall? No   40.  Have you ever be Appearance:  Marfan stigmata (kyphoscoliosis, high-arched palate, pectus excavatum,      arachnodactyly, arm span > height, hyperlaxity, myopia, MVP, aortic insufficiency) Yes    Eyes/Ears/Nose/Throat:    · Pupils equal  · Hearing Yes    Lymph nodes Yes that I/our student will not use performance-enhancing substances as defined in the [facility] Performance-Enhancing Substance Testing Program Protocol.  We have reviewed the policy and understand that I/our student may be asked to submit to testing for the presen

## (undated) NOTE — Clinical Note
Date: 2/28/2017    Patient Name: Gisell Lawson          To Whom it may concern: This letter has been written at the patient's request. The above patient was seen at the [facility] for treatment of a medical condition.     This patient should

## (undated) NOTE — MR AVS SNAPSHOT
[facility]  [address] [place], [facility] [place] [phone]               Thank you for choosing us for your health care visit with Alma Delia Faulkner DO.   We are glad to serve you and happy to pollen, dust, or mold causes swelling inside the sinuses. The swelling prevents mucus from draining. · Obstructions in the nose. A polyp or deviated septum can cause sinusitis that doesn’t go away.  A polyp is a sac of swollen tissue, often the result of i · Antibiotics. If your child’s sinuses are infected with bacteria, antibiotics are given to kill the bacteria. If after 3 to 5 days, your child's symptoms haven't improved, the healthcare provider may try a different antibiotic.   · Allergy medicines. For s · Be sure that your child takes the medicine as directed. For example, some antibiotics should be taken with food. · Ask your child’s doctor or pharmacist what side effects the medicine may cause and what to do about them.   Caring for your child  Many chi eyes or brain. If your child has allergies or asthma, talk with your doctor about treatment options.  Tell your child’s doctor if your child gets more colds or flu than normal.     Call your child's healthcare provider if:  · Your child’s symptoms get worse [facility] access allows you to view health information for your child from their recent   visit, view other health information and more. To sign up or find more information on getting   Proxy Access to your child’s [employer] go to [email or web address] family routines to help everyone lead healthier active lives.  Try:  o Eating breakfast everyday  o Eating low-fat dairy products like yogurt, milk, and cheese  o Regularly eating meals together as a family  o Limiting fast food, take out food, and eating o